# Patient Record
Sex: MALE | Race: WHITE
[De-identification: names, ages, dates, MRNs, and addresses within clinical notes are randomized per-mention and may not be internally consistent; named-entity substitution may affect disease eponyms.]

---

## 2018-12-11 ENCOUNTER — HOSPITAL ENCOUNTER (INPATIENT)
Dept: HOSPITAL 35 - ER | Age: 73
LOS: 3 days | Discharge: HOME | DRG: 871 | End: 2018-12-14
Attending: INTERNAL MEDICINE | Admitting: INTERNAL MEDICINE
Payer: COMMERCIAL

## 2018-12-11 VITALS — SYSTOLIC BLOOD PRESSURE: 133 MMHG | DIASTOLIC BLOOD PRESSURE: 60 MMHG

## 2018-12-11 VITALS — HEIGHT: 70.98 IN | BODY MASS INDEX: 25.2 KG/M2 | WEIGHT: 180 LBS

## 2018-12-11 VITALS — SYSTOLIC BLOOD PRESSURE: 124 MMHG | DIASTOLIC BLOOD PRESSURE: 93 MMHG

## 2018-12-11 VITALS — SYSTOLIC BLOOD PRESSURE: 136 MMHG | DIASTOLIC BLOOD PRESSURE: 80 MMHG

## 2018-12-11 VITALS — SYSTOLIC BLOOD PRESSURE: 151 MMHG | DIASTOLIC BLOOD PRESSURE: 77 MMHG

## 2018-12-11 VITALS — SYSTOLIC BLOOD PRESSURE: 150 MMHG | DIASTOLIC BLOOD PRESSURE: 86 MMHG

## 2018-12-11 DIAGNOSIS — J18.9: ICD-10-CM

## 2018-12-11 DIAGNOSIS — A41.9: Primary | ICD-10-CM

## 2018-12-11 DIAGNOSIS — Z28.21: ICD-10-CM

## 2018-12-11 DIAGNOSIS — Z79.899: ICD-10-CM

## 2018-12-11 DIAGNOSIS — Z90.49: ICD-10-CM

## 2018-12-11 DIAGNOSIS — R63.4: ICD-10-CM

## 2018-12-11 DIAGNOSIS — J44.1: ICD-10-CM

## 2018-12-11 DIAGNOSIS — I10: ICD-10-CM

## 2018-12-11 DIAGNOSIS — R91.1: ICD-10-CM

## 2018-12-11 DIAGNOSIS — D72.829: ICD-10-CM

## 2018-12-11 DIAGNOSIS — J44.0: ICD-10-CM

## 2018-12-11 DIAGNOSIS — N17.0: ICD-10-CM

## 2018-12-11 DIAGNOSIS — Z87.891: ICD-10-CM

## 2018-12-11 DIAGNOSIS — R73.9: ICD-10-CM

## 2018-12-11 LAB
ALBUMIN SERPL-MCNC: 3.6 G/DL (ref 3.4–5)
ALT SERPL-CCNC: 22 U/L (ref 30–65)
ANION GAP SERPL CALC-SCNC: 10 MMOL/L (ref 7–16)
AST SERPL-CCNC: 17 U/L (ref 15–37)
BASOPHILS NFR BLD AUTO: 1 % (ref 0–2)
BILIRUB SERPL-MCNC: 1.6 MG/DL
BUN SERPL-MCNC: 31 MG/DL (ref 7–18)
CALCIUM SERPL-MCNC: 10.1 MG/DL (ref 8.5–10.1)
CHLORIDE SERPL-SCNC: 99 MMOL/L (ref 98–107)
CO2 SERPL-SCNC: 24 MMOL/L (ref 21–32)
CREAT SERPL-MCNC: 1.8 MG/DL (ref 0.7–1.3)
EOSINOPHIL NFR BLD: 0 % (ref 0–3)
ERYTHROCYTE [DISTWIDTH] IN BLOOD BY AUTOMATED COUNT: 12.7 % (ref 10.5–14.5)
GLUCOSE SERPL-MCNC: 132 MG/DL (ref 74–106)
GRANULOCYTES NFR BLD MANUAL: 76 % (ref 36–66)
HCT VFR BLD CALC: 44.3 % (ref 42–52)
HGB BLD-MCNC: 14.9 GM/DL (ref 14–18)
LYMPHOCYTES NFR BLD AUTO: 3 % (ref 24–44)
MCH RBC QN AUTO: 31.4 PG (ref 26–34)
MCHC RBC AUTO-ENTMCNC: 33.8 G/DL (ref 28–37)
MCV RBC: 93.1 FL (ref 80–100)
MONOCYTES NFR BLD: 10 % (ref 1–8)
NEUTROPHILS # BLD: 18.1 THOU/UL (ref 1.4–8.2)
NEUTS BAND NFR BLD: 10 % (ref 0–8)
PLATELET # BLD EST: NORMAL 10*3/UL
PLATELET # BLD: 273 THOU/UL (ref 150–400)
POTASSIUM SERPL-SCNC: 3.7 MMOL/L (ref 3.5–5.1)
PROT SERPL-MCNC: 8.9 G/DL (ref 6.4–8.2)
RBC # BLD AUTO: 4.76 MIL/UL (ref 4.5–6)
RBC MORPH BLD: NORMAL
SODIUM SERPL-SCNC: 133 MMOL/L (ref 136–145)
TROPONIN I SERPL-MCNC: <0.06 NG/ML (ref ?–0.06)
WBC # BLD AUTO: 21 THOU/UL (ref 4–11)

## 2018-12-11 PROCEDURE — 15002 WOUND PREP TRK/ARM/LEG: CPT

## 2018-12-11 PROCEDURE — 10045: CPT

## 2018-12-12 VITALS — SYSTOLIC BLOOD PRESSURE: 122 MMHG | DIASTOLIC BLOOD PRESSURE: 72 MMHG

## 2018-12-12 VITALS — SYSTOLIC BLOOD PRESSURE: 152 MMHG | DIASTOLIC BLOOD PRESSURE: 81 MMHG

## 2018-12-12 VITALS — SYSTOLIC BLOOD PRESSURE: 145 MMHG | DIASTOLIC BLOOD PRESSURE: 83 MMHG

## 2018-12-12 VITALS — SYSTOLIC BLOOD PRESSURE: 132 MMHG | DIASTOLIC BLOOD PRESSURE: 79 MMHG

## 2018-12-12 LAB
ANION GAP SERPL CALC-SCNC: 11 MMOL/L (ref 7–16)
BUN SERPL-MCNC: 24 MG/DL (ref 7–18)
CALCIUM SERPL-MCNC: 8.8 MG/DL (ref 8.5–10.1)
CHLORIDE SERPL-SCNC: 107 MMOL/L (ref 98–107)
CO2 SERPL-SCNC: 22 MMOL/L (ref 21–32)
CREAT SERPL-MCNC: 1.2 MG/DL (ref 0.7–1.3)
ERYTHROCYTE [DISTWIDTH] IN BLOOD BY AUTOMATED COUNT: 13 % (ref 10.5–14.5)
GLUCOSE SERPL-MCNC: 147 MG/DL (ref 74–106)
HCT VFR BLD CALC: 39.3 % (ref 42–52)
HGB BLD-MCNC: 13.4 GM/DL (ref 14–18)
MCH RBC QN AUTO: 31.6 PG (ref 26–34)
MCHC RBC AUTO-ENTMCNC: 34.1 G/DL (ref 28–37)
MCV RBC: 92.7 FL (ref 80–100)
PLATELET # BLD: 224 THOU/UL (ref 150–400)
POTASSIUM SERPL-SCNC: 4.1 MMOL/L (ref 3.5–5.1)
RBC # BLD AUTO: 4.24 MIL/UL (ref 4.5–6)
SODIUM SERPL-SCNC: 140 MMOL/L (ref 136–145)
WBC # BLD AUTO: 13.8 THOU/UL (ref 4–11)

## 2018-12-13 VITALS — DIASTOLIC BLOOD PRESSURE: 66 MMHG | SYSTOLIC BLOOD PRESSURE: 128 MMHG

## 2018-12-13 VITALS — SYSTOLIC BLOOD PRESSURE: 137 MMHG | DIASTOLIC BLOOD PRESSURE: 80 MMHG

## 2018-12-13 VITALS — SYSTOLIC BLOOD PRESSURE: 136 MMHG | DIASTOLIC BLOOD PRESSURE: 72 MMHG

## 2018-12-13 VITALS — DIASTOLIC BLOOD PRESSURE: 63 MMHG | SYSTOLIC BLOOD PRESSURE: 121 MMHG

## 2018-12-13 LAB
EST. AVERAGE GLUCOSE BLD GHB EST-MCNC: 108 MG/DL
GLYCOHEMOGLOBIN (HGB A1C): 5.4 % (ref 4.8–5.6)

## 2018-12-14 VITALS — SYSTOLIC BLOOD PRESSURE: 139 MMHG | DIASTOLIC BLOOD PRESSURE: 85 MMHG

## 2018-12-15 LAB — ANGIOTENSIN CONVERTNG ENZ: 35 U/L (ref 14–82)

## 2019-10-04 ENCOUNTER — HOSPITAL ENCOUNTER (OUTPATIENT)
Dept: HOSPITAL 35 - CAT | Age: 74
End: 2019-10-04
Attending: INTERNAL MEDICINE
Payer: COMMERCIAL

## 2019-10-04 DIAGNOSIS — R91.8: Primary | ICD-10-CM

## 2019-12-10 ENCOUNTER — HOSPITAL ENCOUNTER (EMERGENCY)
Dept: HOSPITAL 35 - ER | Age: 74
LOS: 1 days | Discharge: HOME | End: 2019-12-11
Payer: COMMERCIAL

## 2019-12-10 VITALS — BODY MASS INDEX: 24.5 KG/M2 | WEIGHT: 175 LBS | HEIGHT: 71 IN

## 2019-12-10 DIAGNOSIS — J44.0: ICD-10-CM

## 2019-12-10 DIAGNOSIS — Z87.891: ICD-10-CM

## 2019-12-10 DIAGNOSIS — Z88.8: ICD-10-CM

## 2019-12-10 DIAGNOSIS — J20.9: Primary | ICD-10-CM

## 2019-12-10 LAB
ANION GAP SERPL CALC-SCNC: 8 MMOL/L (ref 7–16)
BUN SERPL-MCNC: 17 MG/DL (ref 7–18)
CALCIUM SERPL-MCNC: 9.7 MG/DL (ref 8.5–10.1)
CHLORIDE SERPL-SCNC: 103 MMOL/L (ref 98–107)
CO2 SERPL-SCNC: 26 MMOL/L (ref 21–32)
CREAT SERPL-MCNC: 1.5 MG/DL (ref 0.7–1.3)
ERYTHROCYTE [DISTWIDTH] IN BLOOD BY AUTOMATED COUNT: 12.9 % (ref 10.5–14.5)
GLUCOSE SERPL-MCNC: 144 MG/DL (ref 74–106)
HCT VFR BLD CALC: 43.2 % (ref 42–52)
HGB BLD-MCNC: 14.5 GM/DL (ref 14–18)
MCH RBC QN AUTO: 31 PG (ref 26–34)
MCHC RBC AUTO-ENTMCNC: 33.7 G/DL (ref 28–37)
MCV RBC: 92 FL (ref 80–100)
PLATELET # BLD: 272 THOU/UL (ref 150–400)
POTASSIUM SERPL-SCNC: 3.4 MMOL/L (ref 3.5–5.1)
RBC # BLD AUTO: 4.69 MIL/UL (ref 4.5–6)
SODIUM SERPL-SCNC: 137 MMOL/L (ref 136–145)
WBC # BLD AUTO: 17.5 THOU/UL (ref 4–11)

## 2019-12-11 VITALS — SYSTOLIC BLOOD PRESSURE: 112 MMHG | DIASTOLIC BLOOD PRESSURE: 80 MMHG

## 2021-07-13 ENCOUNTER — HOSPITAL ENCOUNTER (EMERGENCY)
Dept: HOSPITAL 35 - ER | Age: 76
Discharge: HOME | End: 2021-07-13
Payer: COMMERCIAL

## 2021-07-13 VITALS — BODY MASS INDEX: 22.4 KG/M2 | WEIGHT: 160.01 LBS | HEIGHT: 71 IN

## 2021-07-13 VITALS — SYSTOLIC BLOOD PRESSURE: 118 MMHG | DIASTOLIC BLOOD PRESSURE: 69 MMHG

## 2021-07-13 DIAGNOSIS — J44.9: ICD-10-CM

## 2021-07-13 DIAGNOSIS — U07.1: Primary | ICD-10-CM

## 2021-07-13 DIAGNOSIS — Z88.8: ICD-10-CM

## 2021-07-13 DIAGNOSIS — E86.0: ICD-10-CM

## 2021-07-13 DIAGNOSIS — F17.210: ICD-10-CM

## 2021-07-13 DIAGNOSIS — R10.9: ICD-10-CM

## 2021-07-13 LAB
ALBUMIN SERPL-MCNC: 3.9 G/DL (ref 3.4–5)
ALT SERPL-CCNC: 26 U/L (ref 30–65)
ANION GAP SERPL CALC-SCNC: 10 MMOL/L (ref 7–16)
AST SERPL-CCNC: 21 U/L (ref 15–37)
BASOPHILS NFR BLD AUTO: 0.4 % (ref 0–2)
BILIRUB SERPL-MCNC: 0.5 MG/DL (ref 0.2–1)
BUN SERPL-MCNC: 26 MG/DL (ref 7–18)
CALCIUM SERPL-MCNC: 8.9 MG/DL (ref 8.5–10.1)
CHLORIDE SERPL-SCNC: 97 MMOL/L (ref 98–107)
CO2 SERPL-SCNC: 28 MMOL/L (ref 21–32)
CREAT SERPL-MCNC: 1.8 MG/DL (ref 0.7–1.3)
EOSINOPHIL NFR BLD: 0.1 % (ref 0–3)
ERYTHROCYTE [DISTWIDTH] IN BLOOD BY AUTOMATED COUNT: 13.1 % (ref 10.5–14.5)
GLUCOSE SERPL-MCNC: 104 MG/DL (ref 74–106)
GRANULOCYTES NFR BLD MANUAL: 80.5 % (ref 36–66)
HCT VFR BLD CALC: 44.3 % (ref 42–52)
HGB BLD-MCNC: 15.2 GM/DL (ref 14–18)
LYMPHOCYTES NFR BLD AUTO: 8.3 % (ref 24–44)
MAGNESIUM SERPL-MCNC: 1.9 MG/DL (ref 1.8–2.4)
MCH RBC QN AUTO: 31.3 PG (ref 26–34)
MCHC RBC AUTO-ENTMCNC: 34.2 G/DL (ref 28–37)
MCV RBC: 91.4 FL (ref 80–100)
MONOCYTES NFR BLD: 10.7 % (ref 1–8)
NEUTROPHILS # BLD: 8.2 THOU/UL (ref 1.4–8.2)
PLATELET # BLD: 178 THOU/UL (ref 150–400)
POTASSIUM SERPL-SCNC: 3.6 MMOL/L (ref 3.5–5.1)
PROT SERPL-MCNC: 7.7 G/DL (ref 6.4–8.2)
RBC # BLD AUTO: 4.85 MIL/UL (ref 4.5–6)
SODIUM SERPL-SCNC: 135 MMOL/L (ref 136–145)
TROPONIN I SERPL-MCNC: <0.06 NG/ML (ref ?–0.06)
WBC # BLD AUTO: 10.2 THOU/UL (ref 4–11)

## 2021-07-13 NOTE — EKG
[place] [address] [facility] [place]  [address]
Phone:  [phone]                    ELECTROCARDIOGRAM REPORT      
_______________________________________________________________________________
 
Name:       LARRYDIANAAL JANICE                 Room #:                     [facility]#:      [id number]     Account #:      [id number]  
Admission:  21    Attend Phys:                          
Discharge:  21    Date of Birth:  [birth year]  
                                                          Report #: [id number]
   [id number]
_______________________________________________________________________________
                         [facility] ED
                                       
Test Date:    2021               Test Time:    13:20:11
Pat Name:     GINO JUAREZ              Department:   
Patient ID:   [id number]            Room:          
Gender:       M                        Technician:   nessa
:          1945               Requested By: Jam Cortez
Order Number: [id number] MD:   Randy Layne
                                 Measurements
Intervals                              Axis          
Rate:         97                       P:            48
HI:           146                      QRS:          -54
QRSD:         79                       T:            65
QT:           314                                    
QTc:          399                                    
                           Interpretive Statements
Sinus rhythm
Probable left atrial enlargement
Abnormal R-wave progression, early transition
Inferior infarct, old
Compared to ECG 2018 11:38:55
Sinus tachycardia no longer present
Myocardial infarct finding still present
Electronically Signed On 2021 15:58:07 CDT by Randy Layne
[email or web address]
 
 
 
 
 
 
 
 
 
 
 
 
 
 
 
 
 
 
  <ELECTRONICALLY SIGNED>
   By: Randy Layne MD, [facility]    
  21     1558
D: 21 1320                           _____________________________________
T: 21 1320                           Randy Layne MD, FAC      /EPI

## 2021-07-18 ENCOUNTER — HOSPITAL ENCOUNTER (INPATIENT)
Dept: HOSPITAL 35 - ER | Age: 76
LOS: 10 days | Discharge: HOME | DRG: 177 | End: 2021-07-28
Attending: INTERNAL MEDICINE | Admitting: INTERNAL MEDICINE
Payer: COMMERCIAL

## 2021-07-18 VITALS — SYSTOLIC BLOOD PRESSURE: 148 MMHG | DIASTOLIC BLOOD PRESSURE: 79 MMHG

## 2021-07-18 VITALS — DIASTOLIC BLOOD PRESSURE: 81 MMHG | SYSTOLIC BLOOD PRESSURE: 154 MMHG

## 2021-07-18 VITALS — HEIGHT: 70.98 IN | BODY MASS INDEX: 22.96 KG/M2 | WEIGHT: 164 LBS

## 2021-07-18 VITALS — SYSTOLIC BLOOD PRESSURE: 149 MMHG | DIASTOLIC BLOOD PRESSURE: 85 MMHG

## 2021-07-18 VITALS — DIASTOLIC BLOOD PRESSURE: 85 MMHG | SYSTOLIC BLOOD PRESSURE: 149 MMHG

## 2021-07-18 VITALS — SYSTOLIC BLOOD PRESSURE: 133 MMHG | DIASTOLIC BLOOD PRESSURE: 76 MMHG

## 2021-07-18 DIAGNOSIS — J44.9: ICD-10-CM

## 2021-07-18 DIAGNOSIS — Z90.49: ICD-10-CM

## 2021-07-18 DIAGNOSIS — U07.1: Primary | ICD-10-CM

## 2021-07-18 DIAGNOSIS — Z87.891: ICD-10-CM

## 2021-07-18 DIAGNOSIS — I95.9: ICD-10-CM

## 2021-07-18 DIAGNOSIS — K08.409: ICD-10-CM

## 2021-07-18 DIAGNOSIS — J96.01: ICD-10-CM

## 2021-07-18 DIAGNOSIS — I10: ICD-10-CM

## 2021-07-18 DIAGNOSIS — Z88.8: ICD-10-CM

## 2021-07-18 DIAGNOSIS — J12.82: ICD-10-CM

## 2021-07-18 DIAGNOSIS — Z79.899: ICD-10-CM

## 2021-07-18 LAB
ALBUMIN SERPL-MCNC: 3.2 G/DL (ref 3.4–5)
ALT SERPL-CCNC: 56 U/L (ref 16–63)
ANION GAP SERPL CALC-SCNC: 3 MMOL/L (ref 7–16)
AST SERPL-CCNC: 43 U/L (ref 15–37)
BILIRUB SERPL-MCNC: 0.4 MG/DL (ref 0.2–1)
BUN SERPL-MCNC: 19 MG/DL (ref 7–18)
CALCIUM SERPL-MCNC: 9.7 MG/DL (ref 8.5–10.1)
CHLORIDE SERPL-SCNC: 103 MMOL/L (ref 98–107)
CO2 SERPL-SCNC: 32 MMOL/L (ref 21–32)
CREAT SERPL-MCNC: 1.3 MG/DL (ref 0.7–1.3)
ERYTHROCYTE [DISTWIDTH] IN BLOOD BY AUTOMATED COUNT: 13 % (ref 10.5–14.5)
GLUCOSE SERPL-MCNC: 115 MG/DL (ref 74–106)
HCT VFR BLD CALC: 42.3 % (ref 42–52)
HGB BLD-MCNC: 14.3 GM/DL (ref 14–18)
MCH RBC QN AUTO: 30.9 PG (ref 26–34)
MCHC RBC AUTO-ENTMCNC: 33.8 G/DL (ref 28–37)
MCV RBC: 91.5 FL (ref 80–100)
PLATELET # BLD: 179 THOU/UL (ref 150–400)
POTASSIUM SERPL-SCNC: 4.4 MMOL/L (ref 3.5–5.1)
PROT SERPL-MCNC: 7.2 G/DL (ref 6.4–8.2)
RBC # BLD AUTO: 4.62 MIL/UL (ref 4.5–6)
SODIUM SERPL-SCNC: 138 MMOL/L (ref 136–145)
TROPONIN I SERPL-MCNC: <0.06 NG/ML (ref ?–0.06)
WBC # BLD AUTO: 9 THOU/UL (ref 4–11)

## 2021-07-18 PROCEDURE — 10879: CPT

## 2021-07-19 VITALS — DIASTOLIC BLOOD PRESSURE: 65 MMHG | SYSTOLIC BLOOD PRESSURE: 116 MMHG

## 2021-07-19 VITALS — DIASTOLIC BLOOD PRESSURE: 71 MMHG | SYSTOLIC BLOOD PRESSURE: 119 MMHG

## 2021-07-19 VITALS — SYSTOLIC BLOOD PRESSURE: 111 MMHG | DIASTOLIC BLOOD PRESSURE: 64 MMHG

## 2021-07-19 VITALS — SYSTOLIC BLOOD PRESSURE: 121 MMHG | DIASTOLIC BLOOD PRESSURE: 65 MMHG

## 2021-07-19 LAB
ALBUMIN SERPL-MCNC: 2.6 G/DL (ref 3.4–5)
ALT SERPL-CCNC: 55 U/L (ref 16–63)
ANION GAP SERPL CALC-SCNC: 6 MMOL/L (ref 7–16)
AST SERPL-CCNC: 45 U/L (ref 15–37)
BACTERIA-REFLEX: (no result) /HPF
BILIRUB SERPL-MCNC: 0.5 MG/DL (ref 0.2–1)
BILIRUB UR-MCNC: NEGATIVE MG/DL
BUN SERPL-MCNC: 19 MG/DL (ref 7–18)
CALCIUM SERPL-MCNC: 8.4 MG/DL (ref 8.5–10.1)
CELLULAR CASTS: (no result) /LPF
CHLORIDE SERPL-SCNC: 104 MMOL/L (ref 98–107)
CO2 SERPL-SCNC: 28 MMOL/L (ref 21–32)
COARSE GRAN CASTS #/AREA URNS LPF: (no result) /LPF
COLOR UR: YELLOW
CREAT SERPL-MCNC: 1.3 MG/DL (ref 0.7–1.3)
ERYTHROCYTE [DISTWIDTH] IN BLOOD BY AUTOMATED COUNT: 13.2 % (ref 10.5–14.5)
FINE GRAN CASTS #/AREA URNS LPF: (no result) /LPF
GLUCOSE SERPL-MCNC: 106 MG/DL (ref 74–106)
HCT VFR BLD CALC: 39.4 % (ref 42–52)
HGB BLD-MCNC: 13.2 GM/DL (ref 14–18)
HYALINE CASTS #/AREA URNS LPF: (no result) /LPF
KETONES UR STRIP-MCNC: (no result) MG/DL
MCH RBC QN AUTO: 31 PG (ref 26–34)
MCHC RBC AUTO-ENTMCNC: 33.5 G/DL (ref 28–37)
MCV RBC: 92.3 FL (ref 80–100)
MUCUS: (no result) STRN/LPF
PLATELET # BLD: 181 THOU/UL (ref 150–400)
POTASSIUM SERPL-SCNC: 3.8 MMOL/L (ref 3.5–5.1)
PROT SERPL-MCNC: 6.4 G/DL (ref 6.4–8.2)
RBC # BLD AUTO: 4.27 MIL/UL (ref 4.5–6)
RBC # UR STRIP: NEGATIVE /UL
RBC #/AREA URNS HPF: (no result) /HPF
SODIUM SERPL-SCNC: 138 MMOL/L (ref 136–145)
SP GR UR STRIP: >= 1.03 (ref 1–1.03)
SQUAMOUS: (no result) /LPF (ref 0–3)
URINE CLARITY: (no result)
URINE GLUCOSE-RANDOM*: NEGATIVE
URINE LEUKOCYTES-REFLEX: NEGATIVE
URINE NITRITE-REFLEX: NEGATIVE
URINE PROTEIN (DIPSTICK): (no result)
URINE WBC-REFLEX: (no result) /HPF (ref 0–5)
UROBILINOGEN UR STRIP-ACNC: 0.2 E.U./DL (ref 0.2–1)
WBC # BLD AUTO: 9 THOU/UL (ref 4–11)

## 2021-07-19 PROCEDURE — XW033E5 INTRODUCTION OF REMDESIVIR ANTI-INFECTIVE INTO PERIPHERAL VEIN, PERCUTANEOUS APPROACH, NEW TECHNOLOGY GROUP 5: ICD-10-PCS | Performed by: INTERNAL MEDICINE

## 2021-07-19 NOTE — NUR
PT PROGRESSING TOWARDS D/C GOALS VSS. SAT 92-94% ON 4LNC. RT TX GIVEN. ENC
GOOD ORAL HYGIENE. PT STATED HE WOULD DO LATER. NO S/S SOA NOTED TONIGHT. PT
HAS HAD NO C/O TONIGHT. EINFORCED PT TO ROSA ISELA NS BEFORE GETING OOB TO VOID. BED
ALARM ON . CALL LIGHT IN REACH.

## 2021-07-19 NOTE — NUR
PT ADMITTED FROM ER WITH INCREASED SOA. COVID + PT STATED 8-10 DAYS AGO. ALERT
AND ORIENTED X4. VSS AFEBRILE. SATS WNL ON 4LNC. LUNG SOUNDS DIMINISHED.
INSTRUCTED PT ON FALL PRECAUTIONS. CALL LIGHT IN REACH. BED ALARM IS ON. IVF
INFUSING. ABX STARTED AS ORDERED.

## 2021-07-19 NOTE — NUR
PT IS ALERT AND ORIENTED X4. VSS AFEBRILE. NO C/O PAIN OR SOA. PRESENTLY HE IS
RESTING QUIETLY TRYING TO SLEEP. UNLABORED ON 4LNC. SATS WNL. PRODUCTIVE COUGH
NOTED.  WILL SEND SPUTUM SAMPLE. IV FLUID INFUSING. BED DOWN. CALL LIGHT IN
REACH. BED ALAQRM IS ON.

## 2021-07-19 NOTE — EKG
[address]  [address]
Phone:  [phone]                    ELECTROCARDIOGRAM REPORT      
_______________________________________________________________________________
 
Name:       GINO JUAREZ                 Room #:         [facility].#:      [id number]     Account #:      [id number]  
Admission:  21    Attend Phys:    Renetta Sanchez MD   
Discharge:              Date of Birth:  [birth year]  
                                                          Report #: [id number]
   [id number]
_______________________________________________________________________________
                         [facility] ED
                                       
Test Date:    2021               Test Time:    18:24:38
Pat Name:     GINO JUAREZ              Department:   
Patient ID:   [id number]            Room:         [facility]
Gender:       M                        Technician:   YUNI
:          1945               Requested By: Artuhr Mendoza
Order Number: [id number] MD:   Randy Layne
                                 Measurements
Intervals                              Axis          
Rate:         95                       P:            55
LA:           125                      QRS:          -29
QRSD:         91                       T:            82
QT:           330                                    
QTc:          415                                    
                           Interpretive Statements
Sinus tachycardia
Multiple ventricular premature complexes
Probable left atrial enlargement
Borderline left axis deviation
Borderline low voltage, extremity leads
Abnormal R-wave progression, early transition
Nonspecific T abnrm, anterolateral leads
Compared to ECG 2021 13:20:11
Ventricular premature complex(es) now present
Sinus rhythm no longer present
Myocardial infarct finding no longer present
Electronically Signed On 2021 7:37:58 CDT by Randy Layne
[email or web address]
 
 
 
 
 
 
 
 
 
 
 
 
 
 
  <ELECTRONICALLY SIGNED>
   By: Randy Layne MD, FAC    
  21     0737
D: 21                           _____________________________________
T: 21                           Randy Layne MD, [facility]      /EPI

## 2021-07-19 NOTE — NUR
Nebulizer Treatment:  Pre treatment vitals: BP: 124/76 (04/03/17 1253)  Temp: 97.9 °F (36.6 °C) (04/03/17 1253)  Pulse: 88 (04/03/17 1253)  Resp: 18 (04/03/17 1253)  SpO2: 95 % (04/03/17 1253)   Administered Hand held nebulizer treatment with DuoNeb - 0.5 mg Atrovent and 3 mg Albuterol    Medication Supply: Stock Medication used      Treatment vitals and times recorded in vitals section  Patient tolerated the procedure well.    ASSUMED PT CARE AT SHIFT CHANGE, PT LAYING IN BED, SOA ON EXERTION AND AT
REST. O2 VIA NC. PT APPETITE POOR. ENCOURAGED SMALL SNACKS AND PO DRINKS. NO
OTHER CONCERNS OR QUESTIONS AT THIS TIME.

## 2021-07-20 VITALS — SYSTOLIC BLOOD PRESSURE: 142 MMHG | DIASTOLIC BLOOD PRESSURE: 79 MMHG

## 2021-07-20 VITALS — SYSTOLIC BLOOD PRESSURE: 152 MMHG | DIASTOLIC BLOOD PRESSURE: 66 MMHG

## 2021-07-20 VITALS — DIASTOLIC BLOOD PRESSURE: 89 MMHG | SYSTOLIC BLOOD PRESSURE: 170 MMHG

## 2021-07-20 VITALS — DIASTOLIC BLOOD PRESSURE: 83 MMHG | SYSTOLIC BLOOD PRESSURE: 143 MMHG

## 2021-07-20 LAB
ALBUMIN SERPL-MCNC: 2.4 G/DL (ref 3.4–5)
ALT SERPL-CCNC: 63 U/L (ref 30–65)
ANION GAP SERPL CALC-SCNC: 10 MMOL/L (ref 7–16)
AST SERPL-CCNC: 49 U/L (ref 15–37)
BASOPHILS NFR BLD AUTO: 0.4 % (ref 0–2)
BILIRUB DIRECT SERPL-MCNC: < 0.1 MG/DL
BILIRUB SERPL-MCNC: 0.3 MG/DL (ref 0.2–1)
BUN SERPL-MCNC: 20 MG/DL (ref 7–18)
CALCIUM SERPL-MCNC: 8 MG/DL (ref 8.5–10.1)
CHLORIDE SERPL-SCNC: 104 MMOL/L (ref 98–107)
CO2 SERPL-SCNC: 25 MMOL/L (ref 21–32)
CREAT SERPL-MCNC: 1 MG/DL (ref 0.7–1.3)
EOSINOPHIL NFR BLD: 0 % (ref 0–3)
ERYTHROCYTE [DISTWIDTH] IN BLOOD BY AUTOMATED COUNT: 13.2 % (ref 10.5–14.5)
GLUCOSE SERPL-MCNC: 135 MG/DL (ref 74–106)
GRANULOCYTES NFR BLD MANUAL: 83.6 % (ref 36–66)
HCT VFR BLD CALC: 38.4 % (ref 42–52)
HGB BLD-MCNC: 13 GM/DL (ref 14–18)
LYMPHOCYTES NFR BLD AUTO: 7.2 % (ref 24–44)
MCH RBC QN AUTO: 31.2 PG (ref 26–34)
MCHC RBC AUTO-ENTMCNC: 33.9 G/DL (ref 28–37)
MCV RBC: 92.1 FL (ref 80–100)
MONOCYTES NFR BLD: 8.8 % (ref 1–8)
NEUTROPHILS # BLD: 5 THOU/UL (ref 1.4–8.2)
PHOSPHATE SERPL-MCNC: 3.2 MG/DL (ref 2.5–4.9)
PLATELET # BLD: 208 THOU/UL (ref 150–400)
POTASSIUM SERPL-SCNC: 3.9 MMOL/L (ref 3.5–5.1)
PROT SERPL-MCNC: 6.3 G/DL (ref 6.4–8.2)
RBC # BLD AUTO: 4.17 MIL/UL (ref 4.5–6)
SODIUM SERPL-SCNC: 139 MMOL/L (ref 136–145)
WBC # BLD AUTO: 6 THOU/UL (ref 4–11)

## 2021-07-20 NOTE — NUR
PT PROGRESSING TOWARDS D/C GOALS. VSS. AFEBRILE UNLABORED ON 4LNC. SAT 94%. NO
C/O PAIN. NO S/S DISTRESS. URINE FOR STREP, MRSA AND SPUTUM CX SENT TO LAB.

## 2021-07-20 NOTE — NUR
Care assumed this am. Pt A&O x4. On 4L NC, denies chest pain and any other
pain. Per PT, pt up to bedside commode and able to pace in general area. Dr. Rodriguez aware of MRSA lab result. Up in chair right now. SOB with exertion. Pt
denies any needs at the moment. Will continue to monitor.

## 2021-07-20 NOTE — HC
[facility]
[address] [place]   [address]                     CONSULTATION                  
_______________________________________________________________________________
 
Name:       GINO JUAREZ JANICE                 Room #:         354-P       ADM IN  
M.R.#:      [id number]                       Account #:      [id number]  
Admission:  07/19/21    Attend Phys:    Renetta Sanchez MD   
Discharge:              Date of Birth:  [birth year]  
                                                          Report #: [id number]
                                                                    [id number] 
_______________________________________________________________________________
THIS REPORT FOR:  
 
cc:  Stevie Way MD             
     [facility] - Family physician unknown                                       
     Shasih Rodriguez MD                                           ~
 
DATE OF SERVICE: 07/19/2021
 
INFECTIOUS DISEASE CONSULTATION
 
ATTENDING PHYSICIAN:  Dr. Sanchez.
 
REASON FOR EVALUATION:  COVID-19 infection, complicated by pneumonitis, 
respiratory failure.
 
HISTORY OF PRESENT ILLNESS:  Chart reviewed.  The patient examined.  A 
75-year-old gentleman with a history of hypertension, perhaps some underlying 
COPD, although has not required therapy apparently who was confirmed to be COVID
positive during ER evaluation on 07/13th.  It is notable that his spouse has 
been confirmed with pneumonitis due to COVID, and has been hospitalized.  He was
felt to be stable, was discharged home and instructed to come back if 
progressive dyspnea, which he experienced as a degree of cough.  It is not clear
that he has had significant sustained fevers, although he reports intermittently
having a low-grade temperature elevations.  Denies significant anorexia.  Denies
gastrointestinal related complaints.  He was found to be hypoxic with 
saturations in the 80s.  He is currently maintained on supplemental oxygen 4 
liters per nasal cannula.  He is lucid, found to have a temperature elevation of
102.2  overnight.  He was started on empiric therapy with ceftriaxone and 
azithromycin as well as directed therapy with corticosteroids and remdesivir.
 
ALLERGIES:  LISTED TO LISINOPRIL, WHICH CAUSES COUGH.
 
CURRENT MEDICATIONS:  Include losartan, dexamethasone 6 mg daily, 
hydrochlorothiazide, ipratropium and albuterol inhaler, ascorbic acid, zinc, 
guaifenesin, enoxaparin, ondansetron, remdesivir, azithromycin, ceftriaxone.
 
PAST MEDICAL HISTORY:  As described above, hypertension, underlying COPD.  No 
illicit drug use.
 
FAMILY HISTORY:  Noncontributory.
 
REVIEW OF SYSTEMS:  Otherwise, unremarkable 10-point review of systems.
 
PHYSICAL EXAMINATION:
GENERAL:  He is alert, cooperative, mild to moderate distress, he appears 
somewhat chronically ill.  He is generally lucid.
 
 
 
[address]   [address]                     CONSULTATION                  
_______________________________________________________________________________
 
Name:       GINO JUAREZ                 Room #:         [address] [place] [address] [place]  
..#:      [id number]                       Account #:      [id number]  
Admission:  07/19/21    Attend Phys:    Renetta aSnchez MD   
Discharge:              Date of Birth:  [birth year]  
                                                          Report #: [id number]
                                                                    [id number] 
_______________________________________________________________________________
 
VITAL SIGNS:  Temperature 98.5, T-max overnight 102.2, pulse 59, respirations 
18, blood pressure 119/71.
SKIN:  Warm, dry, no rashes.
HEENT:  Normocephalic.  Extraocular muscles intact.
NECK:  Supple.  Nasal cannula in place.
LUNGS:  Few scattered coarse breath sounds, somewhat diminished.
HEART:  Borderline bradycardic.  I do not appreciate any murmur.
ABDOMEN:  Mildly distended, somewhat firm.  No apparent tenderness.
GENITOURINARY AND RECTAL:  Deferred.
 
LABORATORY DATA:  Cultures sterile thus far.  Electrolytes:  Sodium 138, 
potassium 3.8, chloride 104, bicarbonate 28, anion gap of 6, BUN and creatinine 
19 and 1.3, glucose of 106, AST 45, ALT of 55.  Albumin of 2.6, total protein 
6.4.  Estimated GFR of 54.  CBC:  White count of 9.0, H and H 13.2 and 39.4, 
platelets of 181.  Procalcitonin 0.22.  ProBNP of 241.  Lactic acid 1.1.  CBC:  
White count 9.0, H and H 14.3 and 42.3, platelets of 179.  Initial lab was 
noted.  Chest x-ray, left lower lobe interstitial pulmonary infiltrate.
 
ASSESSMENT AND PLAN:  COVID-19 infection, complicated by pneumonitis and 
respiratory failure, possibility of secondary bacterial pneumonia given the 
high-grade fevers of more localized infiltrate on chest x-ray.  We will adjust 
antimicrobial therapy to additional diagnostic testing.  Continue oxygen 
support, wean as allowed.  Add incentive spirometry.  He remains quite tenuous 
at this point.
 
 
 
 
 
 
 
 
 
 
 
 
 
 
 
 
 
 
 
  <ELECTRONICALLY SIGNED>
   By: Shashi Rodriguez MD           
  07/20/21     0811
D: 07/19/21 0938                           _____________________________________
T: 07/19/21 2252                           hSashi Rodriguez MD             /nt

## 2021-07-21 VITALS — DIASTOLIC BLOOD PRESSURE: 75 MMHG | SYSTOLIC BLOOD PRESSURE: 123 MMHG

## 2021-07-21 VITALS — SYSTOLIC BLOOD PRESSURE: 136 MMHG | DIASTOLIC BLOOD PRESSURE: 75 MMHG

## 2021-07-21 VITALS — DIASTOLIC BLOOD PRESSURE: 75 MMHG | SYSTOLIC BLOOD PRESSURE: 131 MMHG

## 2021-07-21 VITALS — SYSTOLIC BLOOD PRESSURE: 137 MMHG | DIASTOLIC BLOOD PRESSURE: 76 MMHG

## 2021-07-21 LAB
ALBUMIN SERPL-MCNC: 2.5 G/DL (ref 3.4–5)
ALT SERPL-CCNC: 192 U/L (ref 16–63)
ANION GAP SERPL CALC-SCNC: 10 MMOL/L (ref 7–16)
AST SERPL-CCNC: 138 U/L (ref 15–37)
BILIRUB DIRECT SERPL-MCNC: 0.1 MG/DL
BILIRUB SERPL-MCNC: 0.5 MG/DL (ref 0.2–1)
BUN SERPL-MCNC: 20 MG/DL (ref 7–18)
CALCIUM SERPL-MCNC: 8.2 MG/DL (ref 8.5–10.1)
CHLORIDE SERPL-SCNC: 105 MMOL/L (ref 98–107)
CO2 SERPL-SCNC: 25 MMOL/L (ref 21–32)
CREAT SERPL-MCNC: 1 MG/DL (ref 0.7–1.3)
ERYTHROCYTE [DISTWIDTH] IN BLOOD BY AUTOMATED COUNT: 13.1 % (ref 10.5–14.5)
GLUCOSE SERPL-MCNC: 143 MG/DL (ref 74–106)
HCT VFR BLD CALC: 38.7 % (ref 42–52)
HGB BLD-MCNC: 13.2 GM/DL (ref 14–18)
MCH RBC QN AUTO: 31.1 PG (ref 26–34)
MCHC RBC AUTO-ENTMCNC: 34.2 G/DL (ref 28–37)
MCV RBC: 90.9 FL (ref 80–100)
PHOSPHATE SERPL-MCNC: 2.4 MG/DL (ref 2.6–4.7)
PLATELET # BLD: 268 THOU/UL (ref 150–400)
POTASSIUM SERPL-SCNC: 3.5 MMOL/L (ref 3.5–5.1)
PROT SERPL-MCNC: 6.1 G/DL (ref 6.4–8.2)
RBC # BLD AUTO: 4.26 MIL/UL (ref 4.5–6)
SODIUM SERPL-SCNC: 140 MMOL/L (ref 136–145)
WBC # BLD AUTO: 9.1 THOU/UL (ref 4–11)

## 2021-07-21 PROCEDURE — 5A0935A ASSISTANCE WITH RESPIRATORY VENTILATION, LESS THAN 24 CONSECUTIVE HOURS, HIGH NASAL FLOW/VELOCITY: ICD-10-PCS | Performed by: INTERNAL MEDICINE

## 2021-07-21 NOTE — NUR
CARE ASSUMED THIS AM,PT ALERT ORIENTED X4, DENIES CHEST PAIN, NAUSEA AND
VOMITTING. SEEMED MORE ANXIOUS AND SOME SHAKINESS. DR. MCQUEEN MADE AWARE AND
WILL PUT IN ORDER FOR ANTI-ANXIETY MEDS. UP TO BSC. CONTINUE TO BE ON 4L
OXYGEN, OSB WITH EXERTION. CALL LIGHT AND TABLE WITHIN REACH. DENIES ANY NEEDS
JAIME, CONTINUE TO MONITOR.

## 2021-07-21 NOTE — NUR
SW reviewed chart and spoke with nursing and attending physician. Pt remains
in Enhanced Isolation due to COVID. Pt is afebrile and is on 4L of O2. Pt is
on IV abx and IV steroids. Pt started on Ivermectin today and is completing
course of Remdesivir. SW spoke with pt via phone to discuss discharge
planning. SW discussed possible need for home O2. Pt verbalized understanding
and is hoping to be able to be weaned off the O2 prior to admission. SW
discussed options for DME companies if O2 is needed. No preference voiced.
Plan is for pt to discharge home when medically stable.  SW is following to
assist as needed with discharge planning.

## 2021-07-21 NOTE — NUR
C/O dry throat  , NP notified and order received.Cephacol lonzenges given
and stated it helped. He slept intermittently during the night. Maintaining O2
sat in the low 90's on 4L/NC. He verbalized being short of breath with
exertion. Cont. on enhanced precaution , afebrile.

## 2021-07-22 VITALS — DIASTOLIC BLOOD PRESSURE: 76 MMHG | SYSTOLIC BLOOD PRESSURE: 121 MMHG

## 2021-07-22 VITALS — SYSTOLIC BLOOD PRESSURE: 144 MMHG | DIASTOLIC BLOOD PRESSURE: 98 MMHG

## 2021-07-22 VITALS — SYSTOLIC BLOOD PRESSURE: 129 MMHG | DIASTOLIC BLOOD PRESSURE: 71 MMHG

## 2021-07-22 VITALS — SYSTOLIC BLOOD PRESSURE: 130 MMHG | DIASTOLIC BLOOD PRESSURE: 78 MMHG

## 2021-07-22 VITALS — DIASTOLIC BLOOD PRESSURE: 63 MMHG | SYSTOLIC BLOOD PRESSURE: 117 MMHG

## 2021-07-22 LAB
ALBUMIN SERPL-MCNC: 2.6 G/DL (ref 3.4–5)
ALT SERPL-CCNC: 345 U/L (ref 30–65)
ANION GAP SERPL CALC-SCNC: 9 MMOL/L (ref 7–16)
AST SERPL-CCNC: 158 U/L (ref 15–37)
BILIRUB DIRECT SERPL-MCNC: 0.1 MG/DL
BILIRUB SERPL-MCNC: 0.5 MG/DL (ref 0.2–1)
BUN SERPL-MCNC: 22 MG/DL (ref 7–18)
CALCIUM SERPL-MCNC: 8.1 MG/DL (ref 8.5–10.1)
CHLORIDE SERPL-SCNC: 104 MMOL/L (ref 98–107)
CO2 SERPL-SCNC: 26 MMOL/L (ref 21–32)
CREAT SERPL-MCNC: 1.1 MG/DL (ref 0.7–1.3)
GLUCOSE SERPL-MCNC: 137 MG/DL (ref 74–106)
PHOSPHATE SERPL-MCNC: 2.9 MG/DL (ref 2.5–4.9)
POTASSIUM SERPL-SCNC: 3.8 MMOL/L (ref 3.5–5.1)
PROT SERPL-MCNC: 6 G/DL (ref 6.4–8.2)
SODIUM SERPL-SCNC: 139 MMOL/L (ref 136–145)

## 2021-07-22 PROCEDURE — 5A0935A ASSISTANCE WITH RESPIRATORY VENTILATION, LESS THAN 24 CONSECUTIVE HOURS, HIGH NASAL FLOW/VELOCITY: ICD-10-PCS | Performed by: INTERNAL MEDICINE

## 2021-07-22 NOTE — NUR
assumed care of pt at 0700.  pt alert and oriented, no acute distress.
requiring 4L of oxygen via nasal cannula.  up independently to bed side
commode.  remdesivir on hold due to increasing liver enzymes.  pt asked if he
was interested in visiting wife in ICU, says maybe later.  ivf infusing per
order.  no events on telemetry.  maggym.

## 2021-07-22 NOTE — NUR
C/O stomach upset after he had breathing tx. Pt. stated he takes pepcid at
home. NP notified and order received. Pt. verbalized relief. Maintaining O2
sat in the low 90's on 4L/NC. Shortness of breath with exertion. He slept fair
during the night. Voided per commode.

## 2021-07-22 NOTE — NUR
SW reviewed chart and spoke with nursing and attending physician. Pt remains
in Enhanced Isolation due to COVID. Pt is afebrile and requiring 4L of O2. Pt
is on IV meds and completing courses of Remdesivir and Ivermectin. Weekend
discharge anticipated. Pt was not on O2 prior to admission. ADARSH discussed with
nursing and Infection Control RN, that pt is able to go visit his wife in the
ICU. 3W and ICU nurses to coordinate visit. ADARSH is following and available to
assist as needed with discharge planning.

## 2021-07-23 VITALS — SYSTOLIC BLOOD PRESSURE: 121 MMHG | DIASTOLIC BLOOD PRESSURE: 75 MMHG

## 2021-07-23 VITALS — DIASTOLIC BLOOD PRESSURE: 77 MMHG | SYSTOLIC BLOOD PRESSURE: 122 MMHG

## 2021-07-23 VITALS — DIASTOLIC BLOOD PRESSURE: 64 MMHG | SYSTOLIC BLOOD PRESSURE: 119 MMHG

## 2021-07-23 VITALS — SYSTOLIC BLOOD PRESSURE: 136 MMHG | DIASTOLIC BLOOD PRESSURE: 79 MMHG

## 2021-07-23 LAB
ALBUMIN SERPL-MCNC: 2.8 G/DL (ref 3.4–5)
ALT SERPL-CCNC: 335 U/L (ref 30–65)
ANION GAP SERPL CALC-SCNC: 10 MMOL/L (ref 7–16)
AST SERPL-CCNC: 97 U/L (ref 15–37)
BILIRUB DIRECT SERPL-MCNC: 0.2 MG/DL
BILIRUB SERPL-MCNC: 0.7 MG/DL (ref 0.2–1)
BUN SERPL-MCNC: 26 MG/DL (ref 7–18)
CALCIUM SERPL-MCNC: 8.3 MG/DL (ref 8.5–10.1)
CHLORIDE SERPL-SCNC: 103 MMOL/L (ref 98–107)
CO2 SERPL-SCNC: 25 MMOL/L (ref 21–32)
CREAT SERPL-MCNC: 1.2 MG/DL (ref 0.7–1.3)
GLUCOSE SERPL-MCNC: 113 MG/DL (ref 74–106)
PHOSPHATE SERPL-MCNC: 3.4 MG/DL (ref 2.5–4.9)
POTASSIUM SERPL-SCNC: 4.1 MMOL/L (ref 3.5–5.1)
PROT SERPL-MCNC: 5.7 G/DL (ref 6.4–8.2)
SODIUM SERPL-SCNC: 138 MMOL/L (ref 136–145)

## 2021-07-23 NOTE — NUR
Pharmacist notified of critical vanco trough , provider not notified since it
is pharmacy managed. Dose given at MN then next dose will be adjusted per
pharm. O2 at 4L/NC at beginning of shift with O2 sat in the low 90's. RT
titrated O2 up to 5L later on. Encouraged use of IS. Cont. on enhanced
precaution , afebrile. Up to commode to void amd had bm yesterday. Slept fair
during the night. Voiced no concern at this time.

## 2021-07-23 NOTE — NUR
SW reviewed chart and spoke with nursing and attending physician. Pt remains
in Enhanced Isolation due to COVID. Pt is afebrile and requiring 4-5L of O2.
Pt is on IV abx and IV steroids. Remdesivir on hold due elevated liver
enzymes. No weekend discharge planned. Plan is for pt to discharge home when
medically stable. ADARSH is following to assist as needed with discharge planning.

## 2021-07-24 VITALS — DIASTOLIC BLOOD PRESSURE: 77 MMHG | SYSTOLIC BLOOD PRESSURE: 123 MMHG

## 2021-07-24 VITALS — DIASTOLIC BLOOD PRESSURE: 72 MMHG | SYSTOLIC BLOOD PRESSURE: 119 MMHG

## 2021-07-24 VITALS — DIASTOLIC BLOOD PRESSURE: 67 MMHG | SYSTOLIC BLOOD PRESSURE: 114 MMHG

## 2021-07-24 LAB
ALBUMIN SERPL-MCNC: 2.9 G/DL (ref 3.4–5)
ALT SERPL-CCNC: 335 U/L (ref 16–63)
ANION GAP SERPL CALC-SCNC: 10 MMOL/L (ref 7–16)
AST SERPL-CCNC: 85 U/L (ref 15–37)
BILIRUB DIRECT SERPL-MCNC: 0.2 MG/DL
BILIRUB SERPL-MCNC: 0.7 MG/DL (ref 0.2–1)
BUN SERPL-MCNC: 27 MG/DL (ref 7–18)
CALCIUM SERPL-MCNC: 8.7 MG/DL (ref 8.5–10.1)
CHLORIDE SERPL-SCNC: 103 MMOL/L (ref 98–107)
CO2 SERPL-SCNC: 25 MMOL/L (ref 21–32)
CREAT SERPL-MCNC: 1.2 MG/DL (ref 0.7–1.3)
GLUCOSE SERPL-MCNC: 88 MG/DL (ref 74–106)
PHOSPHATE SERPL-MCNC: 3.6 MG/DL (ref 2.6–4.7)
POTASSIUM SERPL-SCNC: 4 MMOL/L (ref 3.5–5.1)
PROT SERPL-MCNC: 5.7 G/DL (ref 6.4–8.2)
SODIUM SERPL-SCNC: 138 MMOL/L (ref 136–145)

## 2021-07-24 NOTE — NUR
PROGRESS
 
PT A/O X4 UP AD NAE VOIDING IN BSC. SLEPT ALL NOC. VSS. LUNGS WITH SCATTERED
RHONCHI IN UPPER LOBES AND DIMINISHED IN BASES HAS A LOOSE COUGH SPUTUM NOT
VISUALIZED HAD A BM YESTERDAY SKIN C/D/I. CONTINUE TO MONITOR.

## 2021-07-24 NOTE — NUR
ASSUMED PT CARE AT SHIFT CHANGE, PT IS EAGER TO HEAR ABOUT HOW WIFE IS DOING,
NO COMPLAINTS. PHYSICIAN TO PHYSICIAN CALL FOR WIFE UPDATE. PT CONTINUES TO
HAVE STUFFY NOSE/COUGH.

## 2021-07-25 VITALS — SYSTOLIC BLOOD PRESSURE: 112 MMHG | DIASTOLIC BLOOD PRESSURE: 66 MMHG

## 2021-07-25 VITALS — SYSTOLIC BLOOD PRESSURE: 114 MMHG | DIASTOLIC BLOOD PRESSURE: 80 MMHG

## 2021-07-25 VITALS — DIASTOLIC BLOOD PRESSURE: 60 MMHG | SYSTOLIC BLOOD PRESSURE: 103 MMHG

## 2021-07-25 VITALS — DIASTOLIC BLOOD PRESSURE: 63 MMHG | SYSTOLIC BLOOD PRESSURE: 116 MMHG

## 2021-07-25 LAB
ALBUMIN SERPL-MCNC: 2.9 G/DL (ref 3.4–5)
ALT SERPL-CCNC: 297 U/L (ref 30–65)
ANION GAP SERPL CALC-SCNC: 8 MMOL/L (ref 7–16)
AST SERPL-CCNC: 59 U/L (ref 15–37)
BASOPHILS NFR BLD AUTO: 0 % (ref 0–2)
BILIRUB SERPL-MCNC: 0.7 MG/DL (ref 0.2–1)
BUN SERPL-MCNC: 26 MG/DL (ref 7–18)
CALCIUM SERPL-MCNC: 8.7 MG/DL (ref 8.5–10.1)
CHLORIDE SERPL-SCNC: 101 MMOL/L (ref 98–107)
CO2 SERPL-SCNC: 28 MMOL/L (ref 21–32)
CREAT SERPL-MCNC: 1.4 MG/DL (ref 0.7–1.3)
EOSINOPHIL NFR BLD: 0 % (ref 0–3)
ERYTHROCYTE [DISTWIDTH] IN BLOOD BY AUTOMATED COUNT: 13.3 % (ref 10.5–14.5)
GLUCOSE SERPL-MCNC: 108 MG/DL (ref 74–106)
GRANULOCYTES NFR BLD MANUAL: 90 % (ref 36–66)
HCT VFR BLD CALC: 44.8 % (ref 42–52)
HGB BLD-MCNC: 14.8 GM/DL (ref 14–18)
LYMPHOCYTES NFR BLD AUTO: 5 % (ref 24–44)
MCH RBC QN AUTO: 30.5 PG (ref 26–34)
MCHC RBC AUTO-ENTMCNC: 33 G/DL (ref 28–37)
MCV RBC: 92.3 FL (ref 80–100)
METAMYELOCYTES NFR BLD: 1 %
MONOCYTES NFR BLD: 4 % (ref 1–8)
NEUTROPHILS # BLD: 17.9 THOU/UL (ref 1.4–8.2)
PLATELET # BLD EST: NORMAL 10*3/UL
PLATELET # BLD: 373 THOU/UL (ref 150–400)
POTASSIUM SERPL-SCNC: 4 MMOL/L (ref 3.5–5.1)
PROT SERPL-MCNC: 6.3 G/DL (ref 6.4–8.2)
RBC # BLD AUTO: 4.86 MIL/UL (ref 4.5–6)
RBC MORPH BLD: NORMAL
SODIUM SERPL-SCNC: 137 MMOL/L (ref 136–145)
WBC # BLD AUTO: 19.9 THOU/UL (ref 4–11)

## 2021-07-25 NOTE — NUR
Pt. stated he slept fair during the night. O2 at 3L/NC , no respiratory
distress. Cont. on enhanced precaution , afebrile. Voiding per commode. Making
some progress towards care plan goals.

## 2021-07-26 VITALS — DIASTOLIC BLOOD PRESSURE: 70 MMHG | SYSTOLIC BLOOD PRESSURE: 99 MMHG

## 2021-07-26 VITALS — DIASTOLIC BLOOD PRESSURE: 61 MMHG | SYSTOLIC BLOOD PRESSURE: 94 MMHG

## 2021-07-26 VITALS — SYSTOLIC BLOOD PRESSURE: 106 MMHG | DIASTOLIC BLOOD PRESSURE: 60 MMHG

## 2021-07-26 VITALS — SYSTOLIC BLOOD PRESSURE: 111 MMHG | DIASTOLIC BLOOD PRESSURE: 72 MMHG

## 2021-07-26 LAB
ALBUMIN SERPL-MCNC: 3 G/DL (ref 3.4–5)
ALT SERPL-CCNC: 225 U/L (ref 16–63)
ANION GAP SERPL CALC-SCNC: 6 MMOL/L (ref 7–16)
AST SERPL-CCNC: 40 U/L (ref 15–37)
BILIRUB SERPL-MCNC: 0.8 MG/DL (ref 0.2–1)
BILIRUB UR-MCNC: NEGATIVE MG/DL
BUN SERPL-MCNC: 36 MG/DL (ref 7–18)
CALCIUM SERPL-MCNC: 9.3 MG/DL (ref 8.5–10.1)
CHLORIDE SERPL-SCNC: 100 MMOL/L (ref 98–107)
CO2 SERPL-SCNC: 31 MMOL/L (ref 21–32)
COLOR UR: YELLOW
CREAT SERPL-MCNC: 1.5 MG/DL (ref 0.7–1.3)
EOSINOPHIL NFR BLD: 1 % (ref 0–3)
ERYTHROCYTE [DISTWIDTH] IN BLOOD BY AUTOMATED COUNT: 13.2 % (ref 10.5–14.5)
GLUCOSE SERPL-MCNC: 105 MG/DL (ref 74–106)
GRANULOCYTES NFR BLD MANUAL: 78 % (ref 36–66)
HCT VFR BLD CALC: 44.9 % (ref 42–52)
HGB BLD-MCNC: 14.7 GM/DL (ref 14–18)
KETONES UR STRIP-MCNC: NEGATIVE MG/DL
LYMPHOCYTES NFR BLD AUTO: 8 % (ref 24–44)
MCH RBC QN AUTO: 30 PG (ref 26–34)
MCHC RBC AUTO-ENTMCNC: 32.8 G/DL (ref 28–37)
MCV RBC: 91.5 FL (ref 80–100)
METAMYELOCYTES NFR BLD: 1 %
MONOCYTES NFR BLD: 9 % (ref 1–8)
NEUTROPHILS # BLD: 17.4 THOU/UL (ref 1.4–8.2)
NEUTS BAND NFR BLD: 3 % (ref 0–8)
PLATELET # BLD EST: NORMAL 10*3/UL
PLATELET # BLD: 395 THOU/UL (ref 150–400)
POTASSIUM SERPL-SCNC: 4 MMOL/L (ref 3.5–5.1)
PROT SERPL-MCNC: 6.4 G/DL (ref 6.4–8.2)
RBC # BLD AUTO: 4.91 MIL/UL (ref 4.5–6)
RBC # UR STRIP: NEGATIVE /UL
RBC MORPH BLD: NORMAL
SODIUM SERPL-SCNC: 137 MMOL/L (ref 136–145)
SP GR UR STRIP: 1.02 (ref 1–1.03)
URINE CLARITY: CLEAR
URINE GLUCOSE-RANDOM*: NEGATIVE
URINE LEUKOCYTES-REFLEX: NEGATIVE
URINE NITRITE-REFLEX: NEGATIVE
URINE PROTEIN (DIPSTICK): NEGATIVE
UROBILINOGEN UR STRIP-ACNC: 0.2 E.U./DL (ref 0.2–1)
WBC # BLD AUTO: 21.5 THOU/UL (ref 4–11)

## 2021-07-26 NOTE — NUR
Slept fair during the night. Maintaining O2 sat in the low 90's to mid 90's on
3L/NC. Cont. on enhanced precaution , afebrile. Voiding per commode. Denies
any concern. Making some progress towards care plan goals.

## 2021-07-26 NOTE — NUR
ADARSH reviewed chart and spoke with nursing and attending physician. Pt remains
in Enhanced Isolation due to COVID. Pt is afebrile and on 3L of O2. Pt is on
IV steroids. Pt's WBC higher today. Pt is not ready for discharge home today.
Pt will need rest/exercise oximetry prior to discharge, as pt was not on O2
prior to admissions. ADARSH spoke with pt via phone to provide update and discuss
discharge plan. Pt is aware that he may need home O2. Pt states he does not
feel that he needs HH. SW confirmed pt's home address and phone number. ADARSH
discussed options for [employer] companies. No preference voiced. ADARSH faxed referral
to [facility]. SW notified [facility] liaison. Plan is for pt to discharge home when
medically stable. ADARSH is following to assist as needed with discharge planning.

## 2021-07-27 VITALS — DIASTOLIC BLOOD PRESSURE: 61 MMHG | SYSTOLIC BLOOD PRESSURE: 113 MMHG

## 2021-07-27 VITALS — SYSTOLIC BLOOD PRESSURE: 121 MMHG | DIASTOLIC BLOOD PRESSURE: 64 MMHG

## 2021-07-27 VITALS — DIASTOLIC BLOOD PRESSURE: 74 MMHG | SYSTOLIC BLOOD PRESSURE: 119 MMHG

## 2021-07-27 VITALS — SYSTOLIC BLOOD PRESSURE: 117 MMHG | DIASTOLIC BLOOD PRESSURE: 63 MMHG

## 2021-07-27 LAB
ANION GAP SERPL CALC-SCNC: 3 MMOL/L (ref 7–16)
BUN SERPL-MCNC: 36 MG/DL (ref 7–18)
CALCIUM SERPL-MCNC: 8.8 MG/DL (ref 8.5–10.1)
CHLORIDE SERPL-SCNC: 104 MMOL/L (ref 98–107)
CO2 SERPL-SCNC: 30 MMOL/L (ref 21–32)
CREAT SERPL-MCNC: 1.3 MG/DL (ref 0.7–1.3)
ERYTHROCYTE [DISTWIDTH] IN BLOOD BY AUTOMATED COUNT: 13.2 % (ref 10.5–14.5)
GLUCOSE SERPL-MCNC: 131 MG/DL (ref 74–106)
HCT VFR BLD CALC: 42.8 % (ref 42–52)
HGB BLD-MCNC: 14.1 GM/DL (ref 14–18)
MAGNESIUM SERPL-MCNC: 2 MG/DL (ref 1.8–2.4)
MCH RBC QN AUTO: 30.5 PG (ref 26–34)
MCHC RBC AUTO-ENTMCNC: 33 G/DL (ref 28–37)
MCV RBC: 92.6 FL (ref 80–100)
PLATELET # BLD: 368 THOU/UL (ref 150–400)
POTASSIUM SERPL-SCNC: 4.4 MMOL/L (ref 3.5–5.1)
RBC # BLD AUTO: 4.63 MIL/UL (ref 4.5–6)
SODIUM SERPL-SCNC: 137 MMOL/L (ref 136–145)
WBC # BLD AUTO: 21.1 THOU/UL (ref 4–11)

## 2021-07-27 NOTE — NUR
Up in the chair till HS. New IV placed on right upper arm. O2 at 3L/NC , no
respiratory distress. Cont. on enhanced precaution , afebrile. Urine specimen
sent to lab. Slept fair during the night. Making some progress towards care
plan goals.

## 2021-07-27 NOTE — NUR
ADARSH reviewed chart and spoke with nursing and attending physician. Pt remains
in Enhanced Isolation due to COVID. Pt is afebrile and on 3L of O2. Pt is on
IV steroids. WBC slightly lower today. Discharge home is anticipated in 1-2
days. Pt will need rest/exercise oximetry prior to discharge. [facility]
delivered portable O2 tank to the hospital today for when pt is ready for
discharge. ADARSH spoke with pt via phone to discuss discharge plan. Pt is hoping
he will not have to go home with O2, but is agreeable if needed. ADARSH is
following to assist as needed with discharge planning.

## 2021-07-27 NOTE — NUR
Nutrition: screen for LOS.  Wt up 4 lb from admit, wt is WNL.  Intake %.
Last recorded BM 7/24.  BUN 36, albumim 3.0.  BG .  Dexamethasone, vit
c, zinc, pepcid, vit D and other meds reivewed.  Assessed at low nutrition
risk.

## 2021-07-28 VITALS — DIASTOLIC BLOOD PRESSURE: 102 MMHG | SYSTOLIC BLOOD PRESSURE: 122 MMHG

## 2021-07-28 VITALS — DIASTOLIC BLOOD PRESSURE: 64 MMHG | SYSTOLIC BLOOD PRESSURE: 119 MMHG

## 2021-07-28 LAB
ANION GAP SERPL CALC-SCNC: 7 MMOL/L (ref 7–16)
BUN SERPL-MCNC: 32 MG/DL (ref 7–18)
CALCIUM SERPL-MCNC: 8.2 MG/DL (ref 8.5–10.1)
CHLORIDE SERPL-SCNC: 104 MMOL/L (ref 98–107)
CO2 SERPL-SCNC: 25 MMOL/L (ref 21–32)
CREAT SERPL-MCNC: 1.3 MG/DL (ref 0.7–1.3)
ERYTHROCYTE [DISTWIDTH] IN BLOOD BY AUTOMATED COUNT: 13 % (ref 10.5–14.5)
GLUCOSE SERPL-MCNC: 87 MG/DL (ref 74–106)
HCT VFR BLD CALC: 40.8 % (ref 42–52)
HGB BLD-MCNC: 13.8 GM/DL (ref 14–18)
MAGNESIUM SERPL-MCNC: 1.9 MG/DL (ref 1.8–2.4)
MCH RBC QN AUTO: 31.1 PG (ref 26–34)
MCHC RBC AUTO-ENTMCNC: 33.8 G/DL (ref 28–37)
MCV RBC: 92.1 FL (ref 80–100)
PLATELET # BLD: 315 THOU/UL (ref 150–400)
POTASSIUM SERPL-SCNC: 4.3 MMOL/L (ref 3.5–5.1)
RBC # BLD AUTO: 4.43 MIL/UL (ref 4.5–6)
SODIUM SERPL-SCNC: 136 MMOL/L (ref 136–145)
WBC # BLD AUTO: 18.4 THOU/UL (ref 4–11)

## 2021-07-28 NOTE — NUR
Up in the  chair at HS. O2 at 1L/NC with O2 sat in the low 90's. No
respiratory distress. Cont. on enhanced precaution ,afebrile. Denies any
concern at this time. Progressing towards discharge goal.

## 2021-07-28 NOTE — NUR
DISCHARGE NOTE:
ADARSH reviewed chart and spokew with nursing and attending physician. Pt is
medically stable for discharge home today. Rest/exercise oximetry study
completed today. Pt does not need home O2. ADARSH notified [facility] liaison, who
will  portable tank later today or tomorrow. SW spoke with pt via phone
to discuss discharge plan. Pt is agreeable with discharge. Pt's son will
provide transportation home. No additional SW needs identified at this time.
ADARSH updated attending physician. Awaiting discharge ppwk at this time. SW is
available to assist should needs arise.
